# Patient Record
Sex: MALE | Race: OTHER
[De-identification: names, ages, dates, MRNs, and addresses within clinical notes are randomized per-mention and may not be internally consistent; named-entity substitution may affect disease eponyms.]

---

## 2018-08-22 ENCOUNTER — HOSPITAL ENCOUNTER (EMERGENCY)
Dept: HOSPITAL 72 - EMR | Age: 58
Discharge: HOME | End: 2018-08-22
Payer: MEDICAID

## 2018-08-22 VITALS — DIASTOLIC BLOOD PRESSURE: 88 MMHG | SYSTOLIC BLOOD PRESSURE: 142 MMHG

## 2018-08-22 VITALS — SYSTOLIC BLOOD PRESSURE: 154 MMHG | DIASTOLIC BLOOD PRESSURE: 93 MMHG

## 2018-08-22 VITALS — HEIGHT: 62 IN | BODY MASS INDEX: 20.24 KG/M2 | WEIGHT: 110 LBS

## 2018-08-22 VITALS — SYSTOLIC BLOOD PRESSURE: 145 MMHG | DIASTOLIC BLOOD PRESSURE: 88 MMHG

## 2018-08-22 VITALS — DIASTOLIC BLOOD PRESSURE: 88 MMHG | SYSTOLIC BLOOD PRESSURE: 145 MMHG

## 2018-08-22 DIAGNOSIS — R55: ICD-10-CM

## 2018-08-22 DIAGNOSIS — K52.9: Primary | ICD-10-CM

## 2018-08-22 LAB
ADD MANUAL DIFF: NO
ALBUMIN SERPL-MCNC: 4.2 G/DL (ref 3.4–5)
ALBUMIN/GLOB SERPL: 1.1 {RATIO} (ref 1–2.7)
ALP SERPL-CCNC: 107 U/L (ref 46–116)
ALT SERPL-CCNC: 24 U/L (ref 12–78)
ANION GAP SERPL CALC-SCNC: 11 MMOL/L (ref 5–15)
APPEARANCE UR: CLEAR
APTT PPP: (no result) S
AST SERPL-CCNC: 16 U/L (ref 15–37)
BASOPHILS NFR BLD AUTO: 0.7 % (ref 0–2)
BILIRUB SERPL-MCNC: 0.7 MG/DL (ref 0.2–1)
BUN SERPL-MCNC: 7 MG/DL (ref 7–18)
CALCIUM SERPL-MCNC: 9.4 MG/DL (ref 8.5–10.1)
CHLORIDE SERPL-SCNC: 99 MMOL/L (ref 98–107)
CK MB SERPL-MCNC: 0.6 NG/ML (ref 0–3.6)
CK SERPL-CCNC: 50 U/L (ref 26–308)
CO2 SERPL-SCNC: 26 MMOL/L (ref 21–32)
CREAT SERPL-MCNC: 0.8 MG/DL (ref 0.55–1.3)
EOSINOPHIL NFR BLD AUTO: 0.7 % (ref 0–3)
ERYTHROCYTE [DISTWIDTH] IN BLOOD BY AUTOMATED COUNT: 11.1 % (ref 11.6–14.8)
GLOBULIN SER-MCNC: 3.7 G/DL
GLUCOSE UR STRIP-MCNC: NEGATIVE MG/DL
HCT VFR BLD CALC: 40.7 % (ref 42–52)
HGB BLD-MCNC: 14 G/DL (ref 14.2–18)
KETONES UR QL STRIP: NEGATIVE
LEUKOCYTE ESTERASE UR QL STRIP: NEGATIVE
LYMPHOCYTES NFR BLD AUTO: 23.8 % (ref 20–45)
MCV RBC AUTO: 92 FL (ref 80–99)
MONOCYTES NFR BLD AUTO: 7 % (ref 1–10)
NEUTROPHILS NFR BLD AUTO: 67.8 % (ref 45–75)
NITRITE UR QL STRIP: NEGATIVE
PH UR STRIP: 8 [PH] (ref 4.5–8)
PLATELET # BLD: 260 K/UL (ref 150–450)
POTASSIUM SERPL-SCNC: 3.6 MMOL/L (ref 3.5–5.1)
PROT UR QL STRIP: NEGATIVE
RBC # BLD AUTO: 4.42 M/UL (ref 4.7–6.1)
SODIUM SERPL-SCNC: 136 MMOL/L (ref 136–145)
SP GR UR STRIP: 1.01 (ref 1–1.03)
UROBILINOGEN UR-MCNC: NORMAL MG/DL (ref 0–1)
WBC # BLD AUTO: 8.6 K/UL (ref 4.8–10.8)

## 2018-08-22 PROCEDURE — 85025 COMPLETE CBC W/AUTO DIFF WBC: CPT

## 2018-08-22 PROCEDURE — 36415 COLL VENOUS BLD VENIPUNCTURE: CPT

## 2018-08-22 PROCEDURE — 82550 ASSAY OF CK (CPK): CPT

## 2018-08-22 PROCEDURE — 71045 X-RAY EXAM CHEST 1 VIEW: CPT

## 2018-08-22 PROCEDURE — 80307 DRUG TEST PRSMV CHEM ANLYZR: CPT

## 2018-08-22 PROCEDURE — 80053 COMPREHEN METABOLIC PANEL: CPT

## 2018-08-22 PROCEDURE — 84484 ASSAY OF TROPONIN QUANT: CPT

## 2018-08-22 PROCEDURE — 96360 HYDRATION IV INFUSION INIT: CPT

## 2018-08-22 PROCEDURE — 99284 EMERGENCY DEPT VISIT MOD MDM: CPT

## 2018-08-22 PROCEDURE — 82553 CREATINE MB FRACTION: CPT

## 2018-08-22 PROCEDURE — 81003 URINALYSIS AUTO W/O SCOPE: CPT

## 2018-08-22 PROCEDURE — 93005 ELECTROCARDIOGRAM TRACING: CPT

## 2018-08-22 NOTE — EMERGENCY ROOM REPORT
History of Present Illness


General


Chief Complaint:  General Complaint


Source:  Patient





Present Illness


HPI


This patient states that he has had multiple episodes of lightheadedness today.

  He is also had nausea intermittently throughout the day.  He had one episode 

of vomiting this morning.  He states that when he stands up he gets lightheaded 

and feels like he is going to pass out.  He did have an episode of diarrhea 

this morning with states that he has had none since.  He denies abdominal pain.

  He denies chest pain or shortness of breath.  He denies fever or chills.  He 

has no other complaints.


Allergies:  


Coded Allergies:  


     No Known Allergies (Unverified , 8/22/18)





Patient History


Past Medical History:  none, see triage record


Past Surgical History:  none


Social History:  Denies: smoking, alcohol use, drug use


Reviewed Nursing Documentation:  PMH: Agreed; PSxH: Agreed





Nursing Documentation-PMH


Past Medical History:  No Stated History





Review of Systems


All Other Systems:  negative except mentioned in HPI





Physical Exam





Vital Signs








  Date Time  Temp Pulse Resp B/P (MAP) Pulse Ox O2 Delivery O2 Flow Rate FiO2


 


8/22/18 18:53 97.8 94 18 148/88 98 Room Air  





 97.9       








Sp02 EP Interpretation:  reviewed, normal


General Appearance:  no apparent distress, alert, GCS 15, non-toxic


Head:  normocephalic, atraumatic


Eyes:  bilateral eye normal inspection, bilateral eye PERRL


ENT:  hearing grossly normal, normal pharynx, no angioedema, normal voice


Neck:  full range of motion, supple/symm/no masses


Respiratory:  chest non-tender, lungs clear, normal breath sounds, no 

respiratory distress, no retraction, no accessory muscle use, speaking full 

sentences


Cardiovascular #1:  regular rate, rhythm, no edema


Gastrointestinal:  normal bowel sounds, non tender, soft, non-distended, no 

guarding, no rebound


Rectal:  deferred


Musculoskeletal:  back normal, gait/station normal, normal range of motion, non-

tender


Neurologic:  alert, oriented x3, responsive, motor strength/tone normal, 

sensory intact, speech normal


Psychiatric:  judgement/insight normal, memory normal, mood/affect normal, no 

suicidal/homicidal ideation


Skin:  normal color, no rash, warm/dry, well hydrated





Medical Decision Making


Diagnostic Impression:  


 Primary Impression:  


 Gastroenteritis


 Additional Impression:  


 Pre-syncope


ER Course


I suspect the pre-syncope that the patient is presenting with is a nonemergent 

in etiology.  Regarding the history, the patient has no history of structural 

heart disease or coronary artery disease, no family history of sudden death, 

has no shortness of breath, and the syncope is not exertional.  On physical exam

, the patient is not hypotensive, has no findings of CHF, and no significant 

cardiac murmur suggestive of valvular heart disease or cardiac outflow 

obstruction.  The patient reports no history of seizure or head trauma.  EKG 

showed no evidence of concerning findings of QT prolongation, Brugada syndrome 

or significant ST changes suggestive of acute ischemia, dysrhythmias or 

significant conduction abnormalities.  On laboratory evaluation, blood sugar 

was normal and the patient is not anemic.  The patient was counseled that, 

though unlikely, the possibility of an emergent cause of syncope may be present 

and that the patient should return immediately if symptoms persist or worsen.  

The patient also has nausea and has had abdominal bloating and diarrhea 

consistent with a viral gastroenteritis.  The patient was able to tolerate 

crackers and Sprite.  I educated patient on a bland diet.  At this time, I didn'

t identify an emergency medical condition.  I believe the patient is stable for 

discharge to followup with her PMD for further workup.





Laboratory Tests








Test


  8/22/18


17:55 8/22/18


20:00


 


White Blood Count


  8.6 K/UL


(4.8-10.8) 


 


 


Red Blood Count


  4.42 M/UL


(4.70-6.10)  L 


 


 


Hemoglobin


  14.0 G/DL


(14.2-18.0)  L 


 


 


Hematocrit


  40.7 %


(42.0-52.0)  L 


 


 


Mean Corpuscular Volume 92 FL (80-99)   


 


Mean Corpuscular Hemoglobin


  31.7 PG


(27.0-31.0)  H 


 


 


Mean Corpuscular Hemoglobin


Concent 34.4 G/DL


(32.0-36.0) 


 


 


Red Cell Distribution Width


  11.1 %


(11.6-14.8)  L 


 


 


Platelet Count


  260 K/UL


(150-450) 


 


 


Mean Platelet Volume


  7.5 FL


(6.5-10.1) 


 


 


Neutrophils (%) (Auto)


  67.8 %


(45.0-75.0) 


 


 


Lymphocytes (%) (Auto)


  23.8 %


(20.0-45.0) 


 


 


Monocytes (%) (Auto)


  7.0 %


(1.0-10.0) 


 


 


Eosinophils (%) (Auto)


  0.7 %


(0.0-3.0) 


 


 


Basophils (%) (Auto)


  0.7 %


(0.0-2.0) 


 


 


Sodium Level


  136 MMOL/L


(136-145) 


 


 


Potassium Level


  3.6 MMOL/L


(3.5-5.1) 


 


 


Chloride Level


  99 MMOL/L


() 


 


 


Carbon Dioxide Level


  26 MMOL/L


(21-32) 


 


 


Anion Gap


  11 mmol/L


(5-15) 


 


 


Blood Urea Nitrogen


  7 mg/dL (7-18)


  


 


 


Creatinine


  0.8 MG/DL


(0.55-1.30) 


 


 


Estimate Glomerular


Filtration Rate > 60 mL/min


(>60) 


 


 


Glucose Level


  97 MG/DL


() 


 


 


Calcium Level


  9.4 MG/DL


(8.5-10.1) 


 


 


Total Bilirubin


  0.7 MG/DL


(0.2-1.0) 


 


 


Aspartate Amino Transferase


(AST) 16 U/L (15-37)


  


 


 


Alanine Aminotransferase (ALT)


  24 U/L (12-78)


  


 


 


Alkaline Phosphatase


  107 U/L


() 


 


 


Total Creatine Kinase


  50 U/L


() 


 


 


Creatine Kinase MB


  0.6 NG/ML


(0.0-3.6) 


 


 


Creatine Kinase MB Relative


Index 1.2  


  


 


 


Troponin I


  0.000 ng/mL


(0.000-0.056) 


 


 


Total Protein


  7.9 G/DL


(6.4-8.2) 


 


 


Albumin


  4.2 G/DL


(3.4-5.0) 


 


 


Globulin 3.7 g/dL   


 


Albumin/Globulin Ratio 1.1 (1.0-2.7)   


 


Urine Color  Pending  


 


Urine Appearance  Pending  


 


Urine pH  Pending  


 


Urine Specific Gravity  Pending  


 


Urine Protein  Pending  


 


Urine Glucose (UA)  Pending  


 


Urine Ketones  Pending  


 


Urine Blood  Pending  


 


Urine Nitrite  Pending  


 


Urine Bilirubin  Pending  


 


Urine Urobilinogen  Pending  


 


Urine Leukocyte Esterase  Pending  


 


Urine Opiates Screen


  


  Negative


(NEGATIVE)


 


Urine Barbiturates Screen


  


  Negative


(NEGATIVE)


 


Phencyclidine (PCP) Screen


  


  Negative


(NEGATIVE)


 


Urine Amphetamines Screen


  


  Negative


(NEGATIVE)


 


Urine Benzodiazepines Screen


  


  Negative


(NEGATIVE)


 


Urine Cocaine Screen


  


  Negative


(NEGATIVE)


 


Urine Marijuana (THC) Screen


  


  Positive


(NEGATIVE)  H








EKG Diagnostic Results


Rate:  normal


Rhythm:  NSR


ST Segments:  no acute changes





Rhythm Strip Diag. Results


EP Interpretation:  yes


Rate:  80's


Rhythm:  NSR, no PVC's, no ectopy





Chest X-Ray Diagnostic Results


Chest X-Ray Diagnostic Results :  


   Chest X-Ray Ordered:  Yes


   # of Views/Limited/Complete:  1 View


   Indication:  Other


   Interpretation:  no consolidation, no effusion, no pneumothorax, no acute 

cardiopulmonary disease


   Impression:  No acute disease





Last Vital Signs








  Date Time  Temp Pulse Resp B/P (MAP) Pulse Ox O2 Delivery O2 Flow Rate FiO2


 


8/22/18 20:23 97.9 78 14 142/88 100 Room Air  





 97.9       








Status:  improved


Disposition:  HOME, SELF-CARE


Condition:  Improved


Scripts


Ondansetron Odt* (ZOFRAN ODT*) 8 Mg Tab.rapdis


8 MG ORAL Q6H PRN for Nausea & Vomiting, #10 TAB


   Prov: Odalys Wheat DO         8/22/18


Referrals:  


[facility],REFERRING (PCP)











Odalys Wheat DO Aug 22, 2018 21:09

## 2018-08-23 NOTE — DIAGNOSTIC IMAGING REPORT
Indication: Syncope

 

Technique: XRAY Chest 1v

 

Comparison: None

 

Findings: Heart size and mediastinal contours are within normal limits for technique.

There is no definite focal airspace consolidation, pleural effusion or pneumothorax.

There is severe S-shaped scoliosis with multilevel degenerative change. No definite

acute osseous abnormality is appreciated.

 

Impression: No radiographic evidence of acute cardiopulmonary disease.

 

Severe scoliosis.

## 2018-08-24 NOTE — CARDIOLOGY REPORT
--------------- APPROVED REPORT --------------





EKG Measurement

Heart [id number]

UT [id number]

[id number]

[id number]

[id number]





Normal sinus rhythm

RV conduction delay

Borderline ECG